# Patient Record
Sex: MALE | Race: WHITE | NOT HISPANIC OR LATINO | URBAN - METROPOLITAN AREA
[De-identification: names, ages, dates, MRNs, and addresses within clinical notes are randomized per-mention and may not be internally consistent; named-entity substitution may affect disease eponyms.]

---

## 2018-07-25 ENCOUNTER — OUTPATIENT (OUTPATIENT)
Dept: OUTPATIENT SERVICES | Facility: HOSPITAL | Age: 78
LOS: 1 days | Discharge: HOME | End: 2018-07-25

## 2018-07-25 NOTE — PROGRESS NOTE ADULT - SUBJECTIVE AND OBJECTIVE BOX
Preliminary Cardiac Catheterization Post-Procedure Report:07-25-18 @ 15:26    After risks, benefits and alternatives of the procedure were explained, consent was signed and placed in the medical record prior to initiation of procedure.  Procedural timeout was taken. Cardiac catheterization was performed without any significant complications. Patient tolerated the procedure well without any significant complications.  Post-procedure vital signs were stable.    Procedure Performed:  [x ] Left Heart Catheterization    Primary Physician: Dr. Nedra MD  Assistant(s): Dr. Fariba MD    Preliminary Procedure Summary (Official full report to follow)    Pre-procedure diagnosis: abnormal stress test  Post Procedure Diagnosis/Impression: 3 vessel CAD, RCA and LCx known from before, new lesion noted in RI.    Left Heart Catheterization:  approximate EF%: 65  [ ] Normal Coronary Arteries  [ ] Luminal Irregularities  [x 3 vessel coronary artery disease     Anesthesia Type  [x] conscious sedation  [x] local/regional anesthesia  [  ] general anesthesia    Estimated Blood Loss  [x ] less than 30 ml    Amount of Contrast used:  50 ml    Condition of patient after procedure  [x] stable  [  ] guarded  [  ] satisfactory     CATH SUMMARY/FINDINGS                 Coronary circulation: There was 3-vessel coronary artery disease -LAD and RCA which is known from before. New significant lesion noted in RI   Left main: Angiography showed no evidence of disease.   LAD: Angiography showed a single discrete lesion. Mid LAD: There was a tubular 40 % stenosis at a site with no prior intervention. There was ASHELY grade 3 flow through the vessel (brisk flow). Circumflex: Angiography showed minor luminal irregularities with no flow limiting lesions, patent stents visualized in mid segment of vessel.   Ramus intermedius: Angiography showed a single discrete lesion. Ostial ramus intermedius: There was a tubular 99 % stenosis. This lesion is a likely culprit for the patient's abnormal stress test. It appears amenable to percutaneous intervention.  RCA: Angiography showed a single discrete lesion. Patent stents seen in proximal-mid RCA. Distal RCA: There was a tubular 50 % stenosis. Distal vessel angiography showed minor luminal irregularities.     Specimens obtained: n/a    Implants: n/a    Follow-up Care:  [x ] D/C Home today  [x ] Return for PCI of RI.  [x ] intensive medical management

## 2018-07-25 NOTE — H&P CARDIOLOGY - HISTORY OF PRESENT ILLNESS
HPI  77 yrs old male with PMHx of CAD/MI s/p multiple PCIs in the past and PAD s/p femoral bypass, DM, HTN,DLD, former smoker andnewly diagnosed lung ca and suppose to have lubectomy and as apart of pre-op had positive NST, he also has HUTCHINSON.He is asymptomatic now.  REVIEW OF SYSTEMS:  CONSTITUTIONAL: No weakness, fevers or chills  EYES/ENT: No visual changes;  No vertigo or throat pain   NECK: No pain or stiffness  RESPIRATORY: No cough, wheezing, hemoptysis; SEE HPI  CARDIOVASCULAR: SEE HPI  GASTROINTESTINAL: No abdominal or epigastric pain. No nausea, vomiting, or hematemesis; No diarrhea or constipation. No melena or hematochezia.  GENITOURINARY: No dysuria, frequency or hematuria  NEUROLOGICAL: No numbness or weakness  SKIN: No itching, rashes      PHYSICAL EXAM:  T(C): --  HR: 85  BP: 128/57  RR: 18  SpO2: 97% ra  GENERAL: NAD  HEAD:  Atraumatic, Normocephalic  EYES: conjunctiva and sclera clear  NECK: No JVD  CHEST/LUNG: Clear to auscultation bilaterally; No wheeze  HEART: Regular rate and rhythm; No murmurs  ABDOMEN: Soft, Nontender, Nondistended; Bowel sounds present  EXTREMITIES:  2+ Peripheral Pulses, No clubbing, cyanosis, or edema  NEUROLOGY:  A&Ox3, appropriate  SKIN: No rashes or lesions  GRACE TEST:Right Grace WNL HPI  77 yrs old male with PMHx of CAD/MI s/p multiple PCIs in the past and PAD s/p femoral bypass, DM, HTN,DLD, former smoker and newly diagnosed lung ca and suppose to have lobectomy and as apart of pre-op had positive NST, he also has HUTCHINSON. He is asymptomatic now.  REVIEW OF SYSTEMS:  CONSTITUTIONAL: No weakness, fevers or chills  EYES/ENT: No visual changes;  No vertigo or throat pain   NECK: No pain or stiffness  RESPIRATORY: No cough, wheezing, hemoptysis; SEE HPI  CARDIOVASCULAR: SEE HPI  GASTROINTESTINAL: No abdominal or epigastric pain. No nausea, vomiting, or hematemesis; No diarrhea or constipation. No melena or hematochezia.  GENITOURINARY: No dysuria, frequency or hematuria  NEUROLOGICAL: No numbness or weakness  SKIN: No itching, rashes      PHYSICAL EXAM:  T(C): --  HR: 85  BP: 128/57  RR: 18  SpO2: 97% ra  GENERAL: NAD  HEAD:  Atraumatic, Normocephalic  EYES: conjunctiva and sclera clear  NECK: No JVD  CHEST/LUNG: Clear to auscultation bilaterally; No wheeze  HEART: Regular rate and rhythm; No murmurs  ABDOMEN: Soft, Nontender, Nondistended; Bowel sounds present  EXTREMITIES:  2+ Peripheral Pulses, No clubbing, cyanosis, or edema  NEUROLOGY:  A&Ox3, appropriate  SKIN: No rashes or lesions  GRACE TEST:Right Graec WNL

## 2018-07-30 DIAGNOSIS — E78.00 PURE HYPERCHOLESTEROLEMIA, UNSPECIFIED: ICD-10-CM

## 2018-07-30 DIAGNOSIS — R94.39 ABNORMAL RESULT OF OTHER CARDIOVASCULAR FUNCTION STUDY: ICD-10-CM

## 2018-07-30 DIAGNOSIS — Z87.891 PERSONAL HISTORY OF NICOTINE DEPENDENCE: ICD-10-CM

## 2018-07-30 DIAGNOSIS — I25.119 ATHEROSCLEROTIC HEART DISEASE OF NATIVE CORONARY ARTERY WITH UNSPECIFIED ANGINA PECTORIS: ICD-10-CM

## 2018-07-30 DIAGNOSIS — I10 ESSENTIAL (PRIMARY) HYPERTENSION: ICD-10-CM

## 2018-09-28 ENCOUNTER — OUTPATIENT (OUTPATIENT)
Dept: OUTPATIENT SERVICES | Facility: HOSPITAL | Age: 78
LOS: 1 days | Discharge: HOME | End: 2018-09-28

## 2018-09-28 DIAGNOSIS — Z98.890 OTHER SPECIFIED POSTPROCEDURAL STATES: Chronic | ICD-10-CM

## 2018-09-28 DIAGNOSIS — Z95.828 PRESENCE OF OTHER VASCULAR IMPLANTS AND GRAFTS: Chronic | ICD-10-CM

## 2018-09-28 RX ORDER — CLOPIDOGREL BISULFATE 75 MG/1
1 TABLET, FILM COATED ORAL
Qty: 0 | Refills: 0 | COMMUNITY

## 2018-09-28 RX ORDER — TAMSULOSIN HYDROCHLORIDE 0.4 MG/1
1 CAPSULE ORAL
Qty: 0 | Refills: 0 | COMMUNITY

## 2018-09-28 RX ORDER — METOPROLOL TARTRATE 50 MG
1 TABLET ORAL
Qty: 0 | Refills: 0 | COMMUNITY

## 2018-09-28 RX ORDER — ASPIRIN/CALCIUM CARB/MAGNESIUM 324 MG
1 TABLET ORAL
Qty: 0 | Refills: 0 | COMMUNITY
Start: 2018-09-28

## 2018-09-28 RX ORDER — DUTASTERIDE 0.5 MG/1
1 CAPSULE, LIQUID FILLED ORAL
Qty: 0 | Refills: 0 | COMMUNITY

## 2018-09-28 RX ORDER — LOSARTAN POTASSIUM 100 MG/1
1 TABLET, FILM COATED ORAL
Qty: 0 | Refills: 0 | COMMUNITY

## 2018-09-28 RX ORDER — ALPRAZOLAM 0.25 MG
0 TABLET ORAL
Qty: 0 | Refills: 0 | COMMUNITY

## 2018-09-28 RX ORDER — ROSUVASTATIN CALCIUM 5 MG/1
1 TABLET ORAL
Qty: 0 | Refills: 0 | COMMUNITY

## 2018-09-28 RX ORDER — DULOXETINE HYDROCHLORIDE 30 MG/1
1 CAPSULE, DELAYED RELEASE ORAL
Qty: 0 | Refills: 0 | COMMUNITY

## 2018-09-28 RX ORDER — ASPIRIN/CALCIUM CARB/MAGNESIUM 324 MG
1 TABLET ORAL
Qty: 0 | Refills: 0 | COMMUNITY

## 2018-09-28 RX ORDER — ISOSORBIDE MONONITRATE 60 MG/1
1 TABLET, EXTENDED RELEASE ORAL
Qty: 0 | Refills: 0 | COMMUNITY

## 2018-09-28 NOTE — CHART NOTE - NSCHARTNOTEFT_GEN_A_CORE
POST OPERATIVE PROCEDURAL DOCUMENTATION  PRE-OP DIAGNOSIS:    POST-OP DIAGNOSIS:    PROCEDURE:   LEFT HEART CATHERIZATION    Physician: Dr Nedra MUNGUIA  Assistant: Dr Felicitas MUNGUIA    ANESTHESIA TYPE:  [x ] Sedation  [ x] Local/Regional  [  ]General Anesthesia    ESTIMATED BLOOD LOSS:          CONDITION  [ x] Good  [   ] Fair  [   ] Serious  [   ] Critical      SPECIMENS REMOVED (IF APPLICABLE):        IMPLANTS (IF APPLICABLE)      FINDINGS:    LEFT HEART CATHERIZATION                                    LVEF%: 55%  LVEDP:   Normal    Left main  Normal    LAD:        Mild diffuse disease                Diag:       Normal    RI: 90% lesion    Left Circumflex:  Mild diffuse disease     OM:  30 % lesion    Right Cornary Artery:  Distal 50 % lesion  RPDA:  Mild diffuse disease           RIGHT HEART CATHERIZATION  PA:  PCW:  CO/CI:    PERCUTANEOUS CORONARY INTERVENTIONS:  PCI with stent to RI      COMPLICATIONS: None      POST-OP DIAGNOSIS    [ x] CAD  [ ] Normal Coronary Arteries  [ ] Luminal Irregularities  [ ] Non-obstructive CAD        PLAN OF CARE      [x ] D/C Home today   [ ]  D/C in AM  [ ] Return to In-patient bed  [ ] Admit for observation  [ ] Return for staged procedure:  [ ] CT Surgery consult called  [ x]  Continue DAPT, B-blocker & Statin therapy  [x ]  Medical Therapy  [x ] Aggressive risk factor modification. The patient should follow a low fat and low calorie diet.

## 2018-09-28 NOTE — H&P ADULT - HISTORY OF PRESENT ILLNESS
77 y/o with CAD s/p PCI coming for cardiac cath for PCI patient with significant CAD from prior diagnostic cath having CP on medical treatment

## 2018-09-28 NOTE — PROGRESS NOTE ADULT - SUBJECTIVE AND OBJECTIVE BOX
Cardiology Follow up    XENIA GONZALEZ   78yMale  PAST MEDICAL & SURGICAL HISTORY:  Diabetes mellitus  Hypertension, unspecified type  Lung cancer  Hyperlipidemia  Coronary artery disease  H/O carotid endarterectomy  H/O aorto-femoral bypass    Allergies    No Known Allergies    Intolerances        Patient without complaints. Pt ambulated without issues/symptoms  Denies CP, SOB, palpitations, or dizziness  No events on telemetry    No Known Allergies    Intolerances        NAD, appears well  S1S2, no murmurs, no JVD  CTA B/L, no wheeze, no rales  SNT +BS  Ext:     Right Radial : NO hematoma or bleeding, dressing; C/D/I , d-stat bracelet in place     Pulses:  +Rad/ +PTs /+DPs/ same as baseline  A&Ox 3    EKG            P                                                                                                  A/P:  I discussed the case with Interventional Cardiologist Dr. Sneed & recommends the following:    S/P PCI Ramus WILMA x 1  	         Continue DAPT(asa 81mg, plavix 75mg daily,  B-Blocker, Statin Therapy with prior meds                    30 day supply of DAPT to be given by RN for home use only                    monitor access site                     NS @ 75cc/hr x 5 hrs                    12 lead ekg prior to d/c                    Pt given instructions on importance of taking antiplatelet medication or risk acute stent thrombosis/death                   Post cath instructions, access site care and activity restrictions reviewed with patient                     Discussed with patient to return to hospital if experience chest pain, shortness breath, dizziness and site bleeding                   Aggressive risk factor modification,  diet counseling, smoking cessation discussed with patient                       Can discharge patient from cardiac standpoint at 730pm if  ekg n/l and stable and ambulating without symptoms                     Follow up with Cardiology Dr. Pelletier in one week.  Instructed to call and make an appointment

## 2018-09-28 NOTE — H&P ADULT - ATTENDING COMMENTS
79 y/o with CAD s/p PCI coming for cardiac cath for PCI patient with significant CAD from prior diagnostic cath having CP on medical treatment    Risk and Benefits discussed with patient

## 2018-09-28 NOTE — H&P ADULT - PMH
Coronary artery disease    Diabetes mellitus    Hyperlipidemia    Hypertension, unspecified type    Lung cancer

## 2018-10-10 DIAGNOSIS — I20.8 OTHER FORMS OF ANGINA PECTORIS: ICD-10-CM

## 2018-10-10 DIAGNOSIS — I25.119 ATHEROSCLEROTIC HEART DISEASE OF NATIVE CORONARY ARTERY WITH UNSPECIFIED ANGINA PECTORIS: ICD-10-CM

## 2018-10-10 DIAGNOSIS — I10 ESSENTIAL (PRIMARY) HYPERTENSION: ICD-10-CM

## 2018-10-10 DIAGNOSIS — E78.00 PURE HYPERCHOLESTEROLEMIA, UNSPECIFIED: ICD-10-CM

## 2018-10-10 DIAGNOSIS — E11.9 TYPE 2 DIABETES MELLITUS WITHOUT COMPLICATIONS: ICD-10-CM

## 2019-08-30 PROBLEM — Z00.00 ENCOUNTER FOR PREVENTIVE HEALTH EXAMINATION: Status: ACTIVE | Noted: 2019-08-30

## 2023-10-31 ENCOUNTER — NON-APPOINTMENT (OUTPATIENT)
Age: 83
End: 2023-10-31

## 2023-10-31 DIAGNOSIS — R41.0 DISORIENTATION, UNSPECIFIED: ICD-10-CM

## 2023-10-31 DIAGNOSIS — I31.9 DISEASE OF PERICARDIUM, UNSPECIFIED: ICD-10-CM

## 2023-10-31 DIAGNOSIS — G25.3 MYOCLONUS: ICD-10-CM

## 2023-10-31 DIAGNOSIS — N42.9 DISORDER OF PROSTATE, UNSPECIFIED: ICD-10-CM

## 2023-10-31 DIAGNOSIS — I25.10 ATHEROSCLEROTIC HEART DISEASE OF NATIVE CORONARY ARTERY W/OUT ANGINA PECTORIS: ICD-10-CM

## 2023-10-31 DIAGNOSIS — M54.17 RADICULOPATHY, LUMBOSACRAL REGION: ICD-10-CM

## 2023-10-31 DIAGNOSIS — G56.21 LESION OF ULNAR NERVE, RIGHT UPPER LIMB: ICD-10-CM

## 2023-10-31 DIAGNOSIS — G56.02 CARPAL TUNNEL SYNDROME, LEFT UPPER LIMB: ICD-10-CM

## 2023-10-31 DIAGNOSIS — R41.3 OTHER AMNESIA: ICD-10-CM

## 2023-10-31 DIAGNOSIS — Z87.39 PERSONAL HISTORY OF OTHER DISEASES OF THE MUSCULOSKELETAL SYSTEM AND CONNECTIVE TISSUE: ICD-10-CM

## 2023-10-31 DIAGNOSIS — R41.844 FRONTAL LOBE AND EXECUTIVE FUNCTION DEFICIT: ICD-10-CM

## 2023-10-31 DIAGNOSIS — I10 ESSENTIAL (PRIMARY) HYPERTENSION: ICD-10-CM

## 2023-10-31 DIAGNOSIS — Z87.891 PERSONAL HISTORY OF NICOTINE DEPENDENCE: ICD-10-CM

## 2023-10-31 DIAGNOSIS — G95.9 DISEASE OF SPINAL CORD, UNSPECIFIED: ICD-10-CM

## 2023-10-31 RX ORDER — ISOSORBIDE MONONITRATE 60 MG/1
60 TABLET, EXTENDED RELEASE ORAL
Refills: 0 | Status: ACTIVE | COMMUNITY

## 2023-10-31 RX ORDER — ASPIRIN 325 MG/1
TABLET, FILM COATED ORAL
Refills: 0 | Status: ACTIVE | COMMUNITY

## 2023-10-31 RX ORDER — LOSARTAN POTASSIUM 25 MG/1
25 TABLET, FILM COATED ORAL
Refills: 0 | Status: ACTIVE | COMMUNITY

## 2023-10-31 RX ORDER — DULOXETINE HYDROCHLORIDE 60 MG/1
60 CAPSULE, DELAYED RELEASE ORAL
Refills: 0 | Status: ACTIVE | COMMUNITY

## 2023-10-31 RX ORDER — METOPROLOL TARTRATE 50 MG/1
50 TABLET ORAL
Refills: 0 | Status: ACTIVE | COMMUNITY

## 2023-10-31 RX ORDER — ROSUVASTATIN CALCIUM 20 MG/1
20 TABLET, FILM COATED ORAL
Refills: 0 | Status: ACTIVE | COMMUNITY

## 2023-10-31 RX ORDER — DUTASTERIDE 0.5 MG/1
0.5 CAPSULE, LIQUID FILLED ORAL
Refills: 0 | Status: ACTIVE | COMMUNITY

## 2023-10-31 RX ORDER — CLOPIDOGREL BISULFATE 75 MG/1
75 TABLET, FILM COATED ORAL
Refills: 0 | Status: ACTIVE | COMMUNITY

## 2023-10-31 RX ORDER — TAMSULOSIN HYDROCHLORIDE 0.4 MG/1
0.4 CAPSULE ORAL
Refills: 0 | Status: ACTIVE | COMMUNITY